# Patient Record
Sex: FEMALE | Race: ASIAN | NOT HISPANIC OR LATINO | Employment: FULL TIME | URBAN - METROPOLITAN AREA
[De-identification: names, ages, dates, MRNs, and addresses within clinical notes are randomized per-mention and may not be internally consistent; named-entity substitution may affect disease eponyms.]

---

## 2017-05-18 ENCOUNTER — COMMUNICATION - HEALTHEAST (OUTPATIENT)
Dept: SCHEDULING | Facility: CLINIC | Age: 27
End: 2017-05-18

## 2017-07-12 ENCOUNTER — COMMUNICATION - HEALTHEAST (OUTPATIENT)
Dept: FAMILY MEDICINE | Facility: CLINIC | Age: 27
End: 2017-07-12

## 2018-07-11 ENCOUNTER — COMMUNICATION - HEALTHEAST (OUTPATIENT)
Dept: FAMILY MEDICINE | Facility: CLINIC | Age: 28
End: 2018-07-11

## 2018-07-11 DIAGNOSIS — R94.39 ABNORMAL CARDIOVASCULAR STRESS TEST: ICD-10-CM

## 2018-07-12 ENCOUNTER — COMMUNICATION - HEALTHEAST (OUTPATIENT)
Dept: FAMILY MEDICINE | Facility: CLINIC | Age: 28
End: 2018-07-12

## 2018-07-13 ENCOUNTER — OFFICE VISIT - HEALTHEAST (OUTPATIENT)
Dept: FAMILY MEDICINE | Facility: CLINIC | Age: 28
End: 2018-07-13

## 2018-07-13 DIAGNOSIS — Z32.00 ENCOUNTER FOR PREGNANCY TEST: ICD-10-CM

## 2018-07-13 DIAGNOSIS — N91.2 AMENORRHEA: ICD-10-CM

## 2018-07-13 DIAGNOSIS — Z3A.01 PREGNANCY WITH 7 COMPLETED WEEKS GESTATION: ICD-10-CM

## 2018-07-13 DIAGNOSIS — R10.9 ABDOMINAL CRAMPING: ICD-10-CM

## 2018-07-13 LAB — HCG UR QL: POSITIVE

## 2018-07-13 ASSESSMENT — MIFFLIN-ST. JEOR: SCORE: 1250.67

## 2018-07-17 ENCOUNTER — COMMUNICATION - HEALTHEAST (OUTPATIENT)
Dept: FAMILY MEDICINE | Facility: CLINIC | Age: 28
End: 2018-07-17

## 2018-07-20 ENCOUNTER — HOSPITAL ENCOUNTER (OUTPATIENT)
Dept: ULTRASOUND IMAGING | Facility: HOSPITAL | Age: 28
Discharge: HOME | End: 2018-07-20
Attending: FAMILY MEDICINE

## 2018-07-21 ENCOUNTER — COMMUNICATION - HEALTHEAST (OUTPATIENT)
Dept: FAMILY MEDICINE | Facility: CLINIC | Age: 28
End: 2018-07-21

## 2018-07-25 ENCOUNTER — PRENATAL OFFICE VISIT - HEALTHEAST (OUTPATIENT)
Dept: FAMILY MEDICINE | Facility: CLINIC | Age: 28
End: 2018-07-25

## 2018-07-25 DIAGNOSIS — Z34.90 SUPERVISION OF NORMAL PREGNANCY: ICD-10-CM

## 2018-07-25 DIAGNOSIS — Z32.01 PREGNANCY TEST POSITIVE: ICD-10-CM

## 2018-07-25 LAB
ALBUMIN UR-MCNC: NEGATIVE MG/DL
AMPHETAMINES UR QL SCN: NORMAL
BARBITURATES UR QL: NORMAL
BASOPHILS # BLD AUTO: 0.1 THOU/UL (ref 0–0.2)
BASOPHILS NFR BLD AUTO: 1 % (ref 0–2)
BENZODIAZ UR QL: NORMAL
CANNABINOIDS UR QL SCN: NORMAL
COCAINE UR QL: NORMAL
CREAT UR-MCNC: 189.6 MG/DL
EOSINOPHIL # BLD AUTO: 0.2 THOU/UL (ref 0–0.4)
EOSINOPHIL NFR BLD AUTO: 2 % (ref 0–6)
ERYTHROCYTE [DISTWIDTH] IN BLOOD BY AUTOMATED COUNT: 13.1 % (ref 11–14.5)
GLUCOSE UR STRIP-MCNC: NEGATIVE MG/DL
HCT VFR BLD AUTO: 37.1 % (ref 35–47)
HGB BLD-MCNC: 12 G/DL (ref 12–16)
HIV 1+2 AB+HIV1 P24 AG SERPL QL IA: NEGATIVE
KETONES UR STRIP-MCNC: NEGATIVE MG/DL
LYMPHOCYTES # BLD AUTO: 2.3 THOU/UL (ref 0.8–4.4)
LYMPHOCYTES NFR BLD AUTO: 23 % (ref 20–40)
MCH RBC QN AUTO: 27.6 PG (ref 27–34)
MCHC RBC AUTO-ENTMCNC: 32.3 G/DL (ref 32–36)
MCV RBC AUTO: 85 FL (ref 80–100)
MONOCYTES # BLD AUTO: 0.7 THOU/UL (ref 0–0.9)
MONOCYTES NFR BLD AUTO: 7 % (ref 2–10)
NEUTROPHILS # BLD AUTO: 6.6 THOU/UL (ref 2–7.7)
NEUTROPHILS NFR BLD AUTO: 68 % (ref 50–70)
OPIATES UR QL SCN: NORMAL
OXYCODONE UR QL: NORMAL
PCP UR QL SCN: NORMAL
PLATELET # BLD AUTO: 329 THOU/UL (ref 140–440)
PMV BLD AUTO: 9.6 FL (ref 8.5–12.5)
RBC # BLD AUTO: 4.35 MILL/UL (ref 3.8–5.4)
WBC: 9.7 THOU/UL (ref 4–11)

## 2018-07-26 LAB
ABO/RH(D): NORMAL
ABORH REPEAT: NORMAL
ANTIBODY SCREEN: NEGATIVE
BACTERIA SPEC CULT: NO GROWTH
COLLECTION METHOD: NORMAL
GUARDIAN FIRST NAME: NORMAL
GUARDIAN LAST NAME: NORMAL
HBV SURFACE AG SERPL QL IA: NEGATIVE
HEALTH CARE PROVIDER CITY: NORMAL
HEALTH CARE PROVIDER NAME: NORMAL
HEALTH CARE PROVIDER PHONE: NORMAL
HEALTH CARE PROVIDER STATE: NORMAL
HEALTH CARE PROVIDER STREET ADDRESS: NORMAL
HEALTH CARE PROVIDER ZIP CODE: NORMAL
LEAD BLD-MCNC: NORMAL UG/DL
LEAD RETEST: NO
LEAD, B: <1 MCG/DL (ref 0–4.9)
PATIENT CITY: NORMAL
PATIENT COUNTY: NORMAL
PATIENT EMPLOYER: NORMAL
PATIENT ETHNICITY: NORMAL
PATIENT HOME PHONE: NORMAL
PATIENT OCCUPATION: NORMAL
PATIENT RACE: NORMAL
PATIENT STATE: NORMAL
PATIENT STREET ADDRESS: NORMAL
PATIENT ZIP CODE: NORMAL
RUBV IGG SERPL QL IA: POSITIVE
SUBMITTING LABORATORY PHONE: NORMAL
T PALLIDUM AB SER QL: NEGATIVE
VENOUS/CAPILLARY: NORMAL

## 2018-08-08 ENCOUNTER — COMMUNICATION - HEALTHEAST (OUTPATIENT)
Dept: FAMILY MEDICINE | Facility: CLINIC | Age: 28
End: 2018-08-08

## 2018-08-08 ENCOUNTER — AMBULATORY - HEALTHEAST (OUTPATIENT)
Dept: FAMILY MEDICINE | Facility: CLINIC | Age: 28
End: 2018-08-08

## 2018-08-08 ENCOUNTER — HOSPITAL ENCOUNTER (OUTPATIENT)
Dept: ULTRASOUND IMAGING | Facility: HOSPITAL | Age: 28
Discharge: HOME | End: 2018-08-08
Attending: PHYSICIAN ASSISTANT

## 2018-08-08 DIAGNOSIS — O20.0 THREATENED MISCARRIAGE: ICD-10-CM

## 2018-08-08 DIAGNOSIS — Z32.01 PREGNANCY TEST POSITIVE: ICD-10-CM

## 2018-08-08 DIAGNOSIS — Z34.90 SUPERVISION OF NORMAL PREGNANCY: ICD-10-CM

## 2018-08-22 ENCOUNTER — OFFICE VISIT - HEALTHEAST (OUTPATIENT)
Dept: FAMILY MEDICINE | Facility: CLINIC | Age: 28
End: 2018-08-22

## 2018-08-22 DIAGNOSIS — Z01.818 PRE-OP EXAM: ICD-10-CM

## 2018-08-22 DIAGNOSIS — O02.1 MISSED ABORTION: ICD-10-CM

## 2018-08-22 ASSESSMENT — MIFFLIN-ST. JEOR
SCORE: 1237.06

## 2018-08-23 ENCOUNTER — ANESTHESIA - HEALTHEAST (OUTPATIENT)
Dept: SURGERY | Facility: AMBULATORY SURGERY CENTER | Age: 28
End: 2018-08-23

## 2018-08-24 ENCOUNTER — SURGERY - HEALTHEAST (OUTPATIENT)
Dept: SURGERY | Facility: AMBULATORY SURGERY CENTER | Age: 28
End: 2018-08-24

## 2018-08-24 ENCOUNTER — HOSPITAL ENCOUNTER (OUTPATIENT)
Dept: SURGERY | Facility: AMBULATORY SURGERY CENTER | Age: 28
Discharge: HOME | End: 2018-08-24
Attending: OBSTETRICS & GYNECOLOGY | Admitting: OBSTETRICS & GYNECOLOGY
Payer: COMMERCIAL

## 2018-08-24 DIAGNOSIS — O02.1 MISSED ABORTION: ICD-10-CM

## 2018-08-24 LAB — HGB BLD-MCNC: 12.7 G/DL

## 2018-08-24 ASSESSMENT — MIFFLIN-ST. JEOR
SCORE: 1237.06
SCORE: 1237.06

## 2018-09-05 ENCOUNTER — AMBULATORY - HEALTHEAST (OUTPATIENT)
Dept: FAMILY MEDICINE | Facility: CLINIC | Age: 28
End: 2018-09-05

## 2018-12-20 ENCOUNTER — COMMUNICATION - HEALTHEAST (OUTPATIENT)
Dept: FAMILY MEDICINE | Facility: CLINIC | Age: 28
End: 2018-12-20

## 2019-01-09 ENCOUNTER — COMMUNICATION - HEALTHEAST (OUTPATIENT)
Dept: FAMILY MEDICINE | Facility: CLINIC | Age: 29
End: 2019-01-09

## 2019-06-13 ENCOUNTER — AMBULATORY - HEALTHEAST (OUTPATIENT)
Dept: LAB | Facility: HOSPITAL | Age: 29
End: 2019-06-13

## 2019-06-13 DIAGNOSIS — O99.810 ABNORMAL MATERNAL GLUCOSE TOLERANCE, ANTEPARTUM: ICD-10-CM

## 2019-06-13 LAB
FASTING STATUS PATIENT QL REPORTED: YES
GLUCOSE 1H P 100 G GLC PO SERPL-MCNC: 192 MG/DL (ref 70–179)
GLUCOSE 2H P 100 G GLC PO SERPL-MCNC: 148 MG/DL (ref 70–154)
GLUCOSE 3H P 100 G GLC PO SERPL-MCNC: 153 MG/DL (ref 70–139)
GLUCOSE P FAST SERPL-MCNC: 79 MG/DL (ref 70–94)

## 2019-07-21 ENCOUNTER — HOME CARE/HOSPICE - HEALTHEAST (OUTPATIENT)
Dept: HOME HEALTH SERVICES | Facility: HOME HEALTH | Age: 29
End: 2019-07-21

## 2019-08-05 ENCOUNTER — TELEPHONE (OUTPATIENT)
Dept: OTHER | Facility: CLINIC | Age: 29
End: 2019-08-05

## 2019-09-28 ENCOUNTER — COMMUNICATION - HEALTHEAST (OUTPATIENT)
Dept: SCHEDULING | Facility: CLINIC | Age: 29
End: 2019-09-28

## 2019-09-28 DIAGNOSIS — Z72.51 UNPROTECTED SEX: ICD-10-CM

## 2019-09-29 ENCOUNTER — COMMUNICATION - HEALTHEAST (OUTPATIENT)
Dept: SCHEDULING | Facility: CLINIC | Age: 29
End: 2019-09-29

## 2021-02-07 ENCOUNTER — COMMUNICATION - HEALTHEAST (OUTPATIENT)
Dept: SCHEDULING | Facility: CLINIC | Age: 31
End: 2021-02-07

## 2021-02-08 ENCOUNTER — COMMUNICATION - HEALTHEAST (OUTPATIENT)
Dept: NURSING | Facility: CLINIC | Age: 31
End: 2021-02-08

## 2021-02-08 ENCOUNTER — AMBULATORY - HEALTHEAST (OUTPATIENT)
Dept: NURSING | Facility: CLINIC | Age: 31
End: 2021-02-08

## 2021-02-08 DIAGNOSIS — U07.1 2019 NOVEL CORONAVIRUS DISEASE (COVID-19): ICD-10-CM

## 2021-02-11 ENCOUNTER — OFFICE VISIT - HEALTHEAST (OUTPATIENT)
Dept: FAMILY MEDICINE | Facility: CLINIC | Age: 31
End: 2021-02-11

## 2021-02-11 DIAGNOSIS — U07.1 PNEUMONIA DUE TO 2019 NOVEL CORONAVIRUS: ICD-10-CM

## 2021-02-11 DIAGNOSIS — O13.9 PIH (PREGNANCY INDUCED HYPERTENSION), ANTEPARTUM: ICD-10-CM

## 2021-02-11 DIAGNOSIS — O09.90 HIGH-RISK PREGNANCY, UNSPECIFIED TRIMESTER: ICD-10-CM

## 2021-02-11 DIAGNOSIS — J12.82 PNEUMONIA DUE TO 2019 NOVEL CORONAVIRUS: ICD-10-CM

## 2021-03-11 ENCOUNTER — AMBULATORY - HEALTHEAST (OUTPATIENT)
Dept: OBGYN | Facility: CLINIC | Age: 31
End: 2021-03-11

## 2021-03-11 DIAGNOSIS — I87.313 CHRONIC VENOUS HYPERTENSION (IDIOPATHIC) WITH ULCER OF BILATERAL LOWER EXTREMITY (H): ICD-10-CM

## 2021-03-11 DIAGNOSIS — L97.929 CHRONIC VENOUS HYPERTENSION (IDIOPATHIC) WITH ULCER OF BILATERAL LOWER EXTREMITY (H): ICD-10-CM

## 2021-03-11 DIAGNOSIS — L97.919 CHRONIC VENOUS HYPERTENSION (IDIOPATHIC) WITH ULCER OF BILATERAL LOWER EXTREMITY (H): ICD-10-CM

## 2021-03-17 ENCOUNTER — RECORDS - HEALTHEAST (OUTPATIENT)
Dept: ADMINISTRATIVE | Facility: OTHER | Age: 31
End: 2021-03-17

## 2021-03-17 ENCOUNTER — ANESTHESIA - HEALTHEAST (OUTPATIENT)
Dept: OBGYN | Facility: HOSPITAL | Age: 31
End: 2021-03-17

## 2021-03-17 ENCOUNTER — SURGERY - HEALTHEAST (OUTPATIENT)
Dept: OBGYN | Facility: HOSPITAL | Age: 31
End: 2021-03-17

## 2021-03-17 ASSESSMENT — MIFFLIN-ST. JEOR: SCORE: 1250.67

## 2021-06-01 VITALS — WEIGHT: 140 LBS | BODY MASS INDEX: 28.28 KG/M2

## 2021-06-01 VITALS — HEIGHT: 59 IN | WEIGHT: 140 LBS | BODY MASS INDEX: 28.22 KG/M2

## 2021-06-01 VITALS
HEIGHT: 59 IN | HEIGHT: 59 IN | WEIGHT: 137 LBS | WEIGHT: 137 LBS | BODY MASS INDEX: 27.62 KG/M2 | BODY MASS INDEX: 27.62 KG/M2

## 2021-06-01 VITALS — BODY MASS INDEX: 27.62 KG/M2 | HEIGHT: 59 IN | WEIGHT: 137 LBS

## 2021-06-01 NOTE — TELEPHONE ENCOUNTER
Call from pt      Status update Rx      She called pharmacy today - nothing there to            A/P:   > Plan B noted as OTC - I will call in as RX to see if your insurance picks up cost that way       I called into pharmacy - provided verbal for Quant #1 / 0 refills        Cortez Mazariegos RN   Triage and Medication Refills

## 2021-06-01 NOTE — TELEPHONE ENCOUNTER
Pt is calling in requesting emergency contraception due to unprotected sex she had last night. Pt denies sexual assault. Pt denies bleeding or vomiting. Pt is not pregnant. On call doctor (Orlando Rice) was called and gave order for (Levonorgestrel) Plan B. Pt reports she will pick it up in the morning. Encouraged pt to pick it up as soon as possible, as it should be taken within 72 hours. Advised pt that Plan B can also be purchased over the counter. Pt agrees with plan.     Lobo Knowles RN Care Connection Triage/Medication Refill    Reason for Disposition    Caller requests prescription for Emergency Contraceptive Pill(s)    Protocols used: CONTRACEPTION - EMERGENCY-A-

## 2021-06-05 VITALS — HEIGHT: 59 IN | BODY MASS INDEX: 28.22 KG/M2 | WEIGHT: 140 LBS

## 2021-06-15 NOTE — PROGRESS NOTES
Olga Singleton is a 31 y.o. female who is being evaluated via a billable video visit.      How would you like to obtain your AVS? MyChart.  If dropped from the video visit, the video invitation should be resent by: Send to e-mail at: [email or web address]  Will anyone else be joining your video visit? No      Video Start Time: 10:52    Assessment & Plan     Pneumonia due to 2019 novel coronavirus  PIH (pregnancy induced hypertension), antepartum  High-risk pregnancy, unspecified trimester  EHR reviewed.   Natural course discussed.   Supportive care recommended.   She will monitor her symptoms closely. She does have pulse oximetry at home. Can monitor her blood pressure as well. She has held her antihypertensives until now. She was advised to await BP check tomorrow at OB until restarting medications since her pressure was so low a few days ago.   She is aware of reasons to be seen urgently. Keep appointment with OB. Consider follow up here in two weeks to ensure resolution of symptoms.   She does not feel ready to return to work. A note was provided to excuse her absence for the next two weeks.        Return in about 2 weeks (around 2021) for Recheck.    Kayla Hess MD  [facility]   Olga Singleton is 31 y.o. and presents today for the following health issues:  31 year old  at 30 weeks, 3 days gestation seen for follow up of covid 19.   She is working with [facility] for her prenatal care. This pregnancy has been complicated by gestational hypertension. Was taking labetolol twice per day.   Was seen at the ED on 21 with worsening cough, trouble breathing and fever. She was found to be positive on 21. She was tested because her  and other family members were positive. She was doing ok but suddenly felt like things were worsening. In the ED she was found to have covid 19 associated pneumonia. She was given supportive care. Was discharged home the same day.  Her antihypertensives were held due to hypotension.               Objective    Vitals:  No vitals were obtained today due to virtual visit.    Physical Exam  GENERAL: Healthy, alert and no distress  EYES: Eyes grossly normal to inspection. No discharge or erythema, or obvious scleral/conjunctival abnormalities.  RESP: No audible wheeze or visible cyanosis.  No visible retractions or increased work of breathing. Is coughing intermittently.   NEURO: Cranial nerves grossly intact. Mentation and speech appropriate for age.  PSYCH: Mentation appears normal, affect normal/bright, judgement and insight intact, normal speech and appearance well-groomed        Video-Visit Detail    Type of service:  Video Visit    Video End Time (time video stopped): 11:12 AM  Originating Location (pt. Location): Home    Distant Location (provider location):  [facility]     Platform used for Video Visit: Jonnie Hess MD

## 2021-06-15 NOTE — PROGRESS NOTES
Clinic Care Coordination Contact:  Community Health Worker Initial Outreach    Reason: [id number] - Ambulatory referral to Care Management (Primary Care)  Note    Patient was discharged  with COVID.   CCC to reach out for resources and to make sure has clear understanding of discharge instructions and COVID precautions.   Assist to make follow up appointment with PCP if needed.        CHW Initial Information Gathering:  Referral Source: PCP  Preferred Hospital: [facility]  [phone]  Preferred Urgent Care: [facility], [phone]  Current living arrangement:: Not Assessed  CHW Additional Questions  If ED/Hospital discharge, follow-up appointment scheduled as recommended?: Yes(ED discharge follow up appt scheduled 2- at 10:20AM with Dr. Hess via virtual visit per pt agreed. NOTE: patient prefer virtual visit.)    Discussion: The clinic Community Health Worker talked with the patient today at the request of the PCP to discuss possible Clinic Care Coordination enrollment.  The service was described to the patient and immediate needs were discussed.  The patient declined enrollement at this time. Completed questioning the patient regarding to notes (above) attached with the referral. CHW offered to assist pt with ED discharge appt scheduling with PCP- Pt agreed. CHW refer pt to connect with PCP office at [phone] with any questions and to be re-refer to CCC service in the future. Pt confirmed understand. The PCP is encouraged to refer in the future if the patient's needs change.    Patient ED discharge follow up appt scheduled 2- at 10:20AM with Dr. Hess via virtual visit (NOTE: pt prefer virtual visit-noted in the appt notes section).     Patient reported/confirmed:  -Confirmed: ED discharged yesterday, 2/7/2021 with COVID. Understood discharge instructions and COVID precautions. No questions regarding to med other discharge instructions.    -No resources need  or any other concerns at this time. Thank you.     Patient accepts CC: No, has no resources need or any other concerns at this time per patient. Patient confirmed she will connect with pcp office for any questions and to be re-refer to CCC service in the future.     CHW Plan:   No further action need from CCC/CHW at this time.   Message route to updates pcp.

## 2021-06-15 NOTE — TELEPHONE ENCOUNTER
Olga is GA 29w6d.    Tested positive for COVID on 2/2.     Now on Day 6 fever chills and body aches.  shortness of breath started 3 days ago and is worsening. Has bloody sputum.    Moderate shortness of breath, patient can be heard with labored breathing over the phone.    OB - Dr. Constantino Gillis OB Gyn.    PLAN:  - ED per protocol.  - care advice reviewed  - call back for further concerns  - patient verbalized understanding and will head to [facility] ED    [facility] ED notified and provider aware of her arrival. They have a fetal monitor on site ready for patient.    Tess Cole RN/[place] Nurse Advisor            Reason for Disposition    MODERATE difficulty breathing (e.g., speaks in phrases, SOB even at rest, pulse 100-120)    Additional Information    Negative: SEVERE difficulty breathing (e.g., struggling for each breath, speaks in single words)    Negative: Difficult to awaken or acting confused (e.g., disoriented, slurred speech)    Negative: Bluish (or gray) lips or face now    Negative: Shock suspected (e.g., cold/pale/clammy skin, too weak to stand, low BP, rapid pulse)    Negative: Sounds like a life-threatening emergency to the triager    Negative: SEVERE or constant chest pain or pressure (Exception: mild central chest pain, present only when coughing)    Protocols used: CORONAVIRUS (COVID-19) DIAGNOSED OR [id number] 1.3.21

## 2021-06-16 PROBLEM — O13.9 PIH (PREGNANCY INDUCED HYPERTENSION), ANTEPARTUM: Status: ACTIVE | Noted: 2019-07-18

## 2021-06-16 PROBLEM — Z98.891 S/P C-SECTION: Status: ACTIVE | Noted: 2021-03-18

## 2021-06-16 PROBLEM — Z34.90 PREGNANT: Status: ACTIVE | Noted: 2019-07-18

## 2021-06-16 NOTE — ANESTHESIA PREPROCEDURE EVALUATION
Anesthesia Evaluation      Patient summary reviewed   No history of anesthetic complications     Airway   Mallampati: II   Pulmonary - negative ROS and normal exam                          Cardiovascular - negative ROS  Exercise tolerance: > or = 4 METS  (+) hypertension (resolved), ,     Rhythm: regular  Rate: normal,         Neuro/Psych - negative ROS     Endo/Other - negative ROS   (+) diabetes mellitus, pregnant (Active abruption requiring emergent )     GI/Hepatic/Renal - negative ROS      Other findings: Lab Results      Component                Value               Date                       WBC                      7.7                 2021                 HGB                      10.9 (L)            2021                 HCT                      34.9 (L)            2021                 MCV                      78 (L)              2021                 PLT                      252                 2021                  Dental - normal exam                          Anesthesia Plan  Planned anesthetic: general endotracheal  TRUE RSI  Type and Screen  ASA 4 - emergent   Induction: intravenous   Anesthetic plan and risks discussed with: patient  Anesthesia plan special considerations: rapid sequence induction, increased risk of difficult airway, antiemetics,   Post-op plan: routine recovery

## 2021-06-16 NOTE — ANESTHESIA CARE TRANSFER NOTE
Last vitals:   Vitals:    03/17/21 1704   BP: 128/58   Pulse: 75   Resp: 16   Temp: 36.7  C (98.1  F)   SpO2: 100%     Patient's level of consciousness is awake  Spontaneous respirations: yes  Maintains airway independently: yes  Dentition unchanged: yes  Oropharynx: oropharynx clear of all foreign objects    QCDR Measures:  ASA# 20 - Surgical Safety Checklist: WHO surgical safety checklist completed prior to induction    PQRS# 430 - Adult PONV Prevention: 4558F - Pt received => 2 anti-emetic agents (different classes) preop & intraop  ASA# 8 - Peds PONV Prevention: NA - Not pediatric patient, not GA or 2 or more risk factors NOT present  PQRS# 424 - Miri-op Temp Management: 4559F - At least one body temp DOCUMENTED => 35.5C or 95.9F within required timeframe  PQRS# 426 - PACU Transfer Protocol: - Transfer of care checklist used  ASA# 14 - Acute Post-op Pain: ASA14B - Patient did NOT experience pain >= 7 out of 10     Volatile agents turned off, no paralytic to reverse, 4/4 twitches with sustained tetany. Pt breathing spontaneously with adequate tidal volumes, following commands, gently suctioned oropharynx, extubated without issue. 10LPM O2 applied via face mask.Transported by CRNA and RN to recovery.

## 2021-06-16 NOTE — ANESTHESIA POSTPROCEDURE EVALUATION
Patient: Olga Singleton  Procedure(s):   SECTION  Anesthesia type: general    Patient location: PACU  Last vitals:   Vitals Value Taken Time   /81 21 1746   Temp 36.7  C (98.1  F) 21 1704   Pulse 75 21 1752   Resp 13 21 1752   SpO2 100 % 21 1752   Vitals shown include unvalidated device data.  Post vital signs: stable  Level of consciousness: awake and responds to simple questions  Post-anesthesia pain: pain controlled  Post-anesthesia nausea and vomiting: no  Pulmonary: unassisted, return to baseline  Cardiovascular: stable and blood pressure at baseline  Hydration: adequate  Anesthetic events: no    QCDR Measures:  ASA# 11 - Miri-op Cardiac Arrest: ASA11B - Patient did NOT experience unanticipated cardiac arrest  ASA# 12 - Miri-op Mortality Rate: ASA12B - Patient did NOT die  ASA# 13 - PACU Re-Intubation Rate: ASA13B - Patient did NOT require a new airway mgmt  ASA# 10 - Composite Anes Safety: ASA10A - No serious adverse event    Additional Notes:

## 2021-06-19 NOTE — PROGRESS NOTES
Patient had an initial office visit for pregnancy on 2018, had an ultrasound done on 2018 that showed a 5-week 4-day pregnancy, no embryo visualized.  I saw her for pregnancy intake on 2018.  Please see that note for details.  She was feeling fine, no concerns.  .  Blood type O+.  We had plans to get a follow-up ultrasound in approximately 2 weeks assess for interval growth and viability.  She had a follow-up ultrasound done this morning.    Single abnormally shaped intrauterine anechoic sac. Mean sac diameter is 29 mm. Possible 3 mm embryonic disc without heartbeat.     Patient should have been 8 weeks 2 days today, based on initial gestational age from first ultrasound.  I called patient on her cell phone and reviewed results.    She is feeling fine, no cramping or bleeding.  I reviewed that ultrasound shows pregnancy is not healthy and has not been growing appropriately.    I discussed general course of miscarriage with patient.  She can monitor and watch for miscarriage over the next 2 weeks, or we could have her see OB/GYN sooner for follow-up.  She is not sure what she would like to do, would like to think about it for day or two.  She will contact us if she would like to see OB/GYN.  I discussed warning signs for problems during miscarriage such as significant pain or too much bleeding.  If patient has either one of these, or other concerns, she is instructed to call us or go in to the emergency room.  Condolences expressed.  Burial/cremation services offered.  Pregnancy loss counseling services offered.  Patient will let us know if she is interested in these.  She was accompanied by her  today and she spoke with him during our phone call.    All of her questions were answered.  She is encouraged to follow-up in clinic or through MyChart/phone if she has further questions or concerns.

## 2021-06-19 NOTE — PROGRESS NOTES
" Patient ID: Olga Singleton is a 28 y.o. female.  /74  Pulse 87  Ht 4' 11\" (1.499 m)  Wt 140 lb (63.5 kg)  LMP 2018 Comment: irregular  SpO2 100%  BMI 28.28 kg/m2    Assessment/Plan:                   Diagnoses and all orders for this visit:    Encounter for pregnancy test  -     Pregnancy, Urine    Amenorrhea    Pregnancy with 7 completed weeks gestation  -     US OB < 14 Weeks    Abdominal cramping          DISCUSSION  Obtain ultrasound to help with dating.  Abdominal cramping likely to be of a benign etiology but discussed signs and symptoms which would require further evaluation.  She has an overall benign exam.    Continue with prenatal vitamin.  Schedule first obstetrical visit with primary care provider.  Subjective:     HPI    Olga Singleton is a 28 y.o. female who is here today reporting a history of positive home pregnancy test and likely missed menstrel period.  Patient and her  have trying to conceive and she is happy about this.  She states she has taken 3 tests at home 2 of them have been positive but one was negative so she is somewhat concerned.  Patient also reports some fleeting crampy like abdominal pain that occurred the other day on the right side but has not returned.  She reports no unusual bleeding leakage of fluid or other concerns.  She is a healthy individual.  She does not have ongoing medical problems and does not take medications although started a prenatal vitamin with folate upon her first positive urine pregnancy test.  Patient states that she has had one other pregnancy and delivery making her a .  She states that she developed a pre-eclampsia and required induction with her last pregnancy and 3.  She reports no significant issues after delivery.    Based on a last menstrual period start date of May 19, 2018 patient would be 7 weeks 6 days gestation at this time.  Her estimated date of delivery would be 2018.  Patient reports that she does have " "irregular periods ranging between 28-56 days on average.    Review of Systems  Complete review of systems is obtained.  Other than the specific considerations noted above complete review of systems is negative.          Objective:   Medications:  Current Outpatient Prescriptions   Medication Sig Note     PRENATAL 19 29 mg iron- 1 mg Chew  3/31/2016: Received from: External Pharmacy     ascorbic acid (VITAMIN C) 500 MG tablet Take 1 tablet (500 mg total) by mouth 2 (two) times a day with meals.      labetalol (TRANDATE; NORMODYNE) 100 MG tablet Take 1 tablet (100 mg total) by mouth 2 (two) times a day.      prenatal #115-iron-folic acid 29 mg iron- 1 mg Chew Chew 1 tablet daily.        Allergies:  No Known Allergies    Tobacco:   reports that she has never smoked. She has never used smokeless tobacco.     Physical Exam          /74  Pulse 87  Ht 4' 11\" (1.499 m)  Wt 140 lb (63.5 kg)  LMP 05/24/2018 Comment: irregular  SpO2 100%  BMI 28.28 kg/m2            General Appearance:    Alert, cooperative, no distress, appears stated age   Eyes:   No scleral icterus or conjunctival irritation       Lungs:     Clear to auscultation bilaterally, respirations unlabored, no wheezes or crackles   Heart:    Regular rate and rhythm,  no murmur, rub   or gallop   Abdomen:     Soft, non-tender, bowel sounds active,     no masses, no organomegaly     Extremities:   Extremities normal, atraumatic, no cyanosis or edema   Skin:   Skin color, texture, turgor normal, no rashes or lesions   Neurologic:   Normal strength and sensation        throughout on gross examination.                     "

## 2021-06-19 NOTE — PROGRESS NOTES
Chief Complaint:  Chief Complaint   Patient presents with     Pregnancy Confirmation     #2     HPI:   Olga Singleton is a 28 y.o. female is here for pregnancy intake.  She is .  Patient's last menstrual period was 2018.  Feeling tired, otherwise OK.    Past medical history: reviewed and updated.  Past Medical History:   Diagnosis Date     Arthropathy Of The Ankle / Foot     Created by Conversion      Blood loss anemia 4/3/2016     History of transfusion      Hypertension     with this  pregnancy     Hypertension      Normal delivery      Preeclampsia 2016     Proteinuria 3/31/2016     Urinary tract infection      Past Surgical History:   Procedure Laterality Date     none         Current Outpatient Prescriptions:      prenatal #115-iron-folic acid 29 mg iron- 1 mg Chew, Chew 1 tablet daily., Disp: , Rfl:     Social:  Social History   Substance Use Topics     Smoking status: Never Smoker     Smokeless tobacco: Never Used     Alcohol use No       OBJECTIVE:  No Known Allergies  Vitals:    18 1334   BP: 128/64   Pulse: 64   Resp: 16     Body mass index is 28.28 kg/(m^2).    Vital signs stable as recorded above  General: Patient is alert and oriented x 3, in no apparent distress  Physical exam deferred to patient's First OB Visit.    Results:  Results for orders placed or performed in visit on 18   Culture, Urine   Result Value Ref Range    Culture No Growth    Urinalysis, OB Screen (ketones, glucose, protein only)   Result Value Ref Range    Glucose, UA Negative Negative    Ketones, UA Negative Negative    Protein, UA Negative Negative mg/dL   ABO/RH Typing (OP order)   Result Value Ref Range    HML ABO/Rh Typing O POS     HML ABO/Rh Repeat Typing O POS    Hepatitis B Surface antigen (HBsAG)   Result Value Ref Range    Hepatitis B Surface Ag Negative Negative   HIV Antigen/Antibody Screening Cascade   Result Value Ref Range    HIV Antigen / Antibody Negative Negative   HML Antibody Screen    Result Value Ref Range    HML Antibody Screen Negative Negative   RPR   Result Value Ref Range    Treponema Antibody (Syphilis) Negative Negative   Rubella Immune Status (IgG)   Result Value Ref Range    Rubella Antibody, IgG Positive    Lead, Blood   Result Value Ref Range    Lead  <5.0 ug/dL    Collection Method Venous     Lead Retest No    Drugs of Abuse 1,Urine   Result Value Ref Range    Amphetamines Screen Negative Screen Negative    Benzodiazepines Screen Negative Screen Negative    Opiates Screen Negative Screen Negative    Phencyclidine Screen Negative Screen Negative    THC Screen Negative Screen Negative    Barbiturates Screen Negative Screen Negative    Cocaine Metabolite Screen Negative Screen Negative    Oxycodone Screen Negative Screen Negative    Creatinine, Urine 189.6 mg/dL   HM1 (CBC with Diff)   Result Value Ref Range    WBC 9.7 4.0 - 11.0 thou/uL    RBC 4.35 3.80 - 5.40 mill/uL    Hemoglobin 12.0 12.0 - 16.0 g/dL    Hematocrit 37.1 35.0 - 47.0 %    MCV 85 80 - 100 fL    MCH 27.6 27.0 - 34.0 pg    MCHC 32.3 32.0 - 36.0 g/dL    RDW 13.1 11.0 - 14.5 %    Platelets 329 140 - 440 thou/uL    MPV 9.6 8.5 - 12.5 fL    Neutrophils % 68 50 - 70 %    Lymphocytes % 23 20 - 40 %    Monocytes % 7 2 - 10 %    Eosinophils % 2 0 - 6 %    Basophils % 1 0 - 2 %    Neutrophils Absolute 6.6 2.0 - 7.7 thou/uL    Lymphocytes Absolute 2.3 0.8 - 4.4 thou/uL    Monocytes Absolute 0.7 0.0 - 0.9 thou/uL    Eosinophils Absolute 0.2 0.0 - 0.4 thou/uL    Basophils Absolute 0.1 0.0 - 0.2 thou/uL   Lead Venous With Demographics   Result Value Ref Range    Lead, B <1.0 0.0 - 4.9 mcg/dL    Venous/Capillary Venous     Patient Street Address [address] [place] [address]     Patient Zip Code [address]     [facility]     Patient Home Phone [phone]     Patient Race      Patient Ethnicity NON-     Patient Occupation NA     Patient Employer NA     Guardian First Name NA      Guardian Last Name      Health Care Provider Name EMILY MARTINEZ PA-C     Health Care Provider Street Address      Health Care Provider [facility]     Health Care Provider [facility]     Health Care Provider Zip Code      Health Care Provider Phone [phone]     Submitting Laboratory Phone [phone]      Other screening pregnancy lab work is ordered and pending.    Patient scored a 0/30 on [place]  Depression Screen.    Assessment and Plan:  1. Pregnancy Intake Appointment.  Olga Singleton is 28 y.o. and .  Patient's last menstrual period was 2018.  Estimated Date of Delivery: 3/18/19  She will be seeing Dr. Hess for OB care.  Screening pregnancy lab work was drawn.  Prenatal vitamins prescribed.  Problem list and current medications reviewed and updated.  Dating ultrasound ordered.  Prenatal info packet given.  First trimester screening was discussed with patient.  She would like this done, ordered today.    2. Preeclampsia .  Took blood pressure medicine for a few months post-partum, then stopped.    Follow up in 6 weeks.  Please see OB Episode for complete details.  Visit was approximately 30 minutes, greater than 50% of time spent in face-to-face counseling and coordination of care.    This dictation uses voice recognition software, which may contain typographical errors.

## 2021-06-20 ENCOUNTER — HEALTH MAINTENANCE LETTER (OUTPATIENT)
Age: 31
End: 2021-06-20

## 2021-06-20 NOTE — ANESTHESIA POSTPROCEDURE EVALUATION
Patient: Olga Singleton  SUCTION DILATION AND CURETTAGE  Anesthesia type: MAC    Patient location: Phase II Recovery  Last vitals:   Vitals:    08/24/18 1212   BP:    Pulse: 92   Resp: 16   Temp:    SpO2: 97%     Post vital signs: stable  Level of consciousness: awake and responds to simple questions  Post-anesthesia pain: pain controlled  Post-anesthesia nausea and vomiting: no  Pulmonary: unassisted, return to baseline  Cardiovascular: stable and blood pressure at baseline  Hydration: adequate  Anesthetic events: no    QCDR Measures:  ASA# 11 - Miri-op Cardiac Arrest: ASA11B - Patient did NOT experience unanticipated cardiac arrest  ASA# 12 - Miri-op Mortality Rate: ASA12B - Patient did NOT die  ASA# 13 - PACU Re-Intubation Rate: ASA13B - Patient did NOT require a new airway mgmt  ASA# 10 - Composite Anes Safety: ASA10A - No serious adverse event    Additional Notes:

## 2021-06-20 NOTE — OP NOTE
PROCEDURE NOTE - SUCTION D & C    NAME: Olga Singleton  : 1990  MRN: [id number]     DATE OF SERVICE: 2018     PREOPERATIVE DIAGNOSIS: Missed AB at 8 weeks gestation    POSTOPERATIVE DIAGNOSIS: Same    PROCEDURE: Suction dilatation and curettage    SURGEON: Jv Del Cid    ANESTHESIA: MAC    ESTIMATED BLOOD LOSS: 20 mL from 2018 11:05 AM to 2018 11:31 AM    SPECIMENS: Uterine contents, sent to pathology    COMPLICATIONS: None.     HISTORY OF PRESENT ILLNESS:  This is a 28 y.o. female with a known missed . The risks, benefits and alternatives to the procedure were discussed with her at length.  She expressed understanding and wished to proceed.     PROCEDURE NOTE:  Patient was brought to the operating room and after induction of anesthesia was prepped and draped in the dorsal lithotomy position.  A time out was called and the patient and the procedure were verified.  A bimanual exam was done.  Uterus was noted to be 9 weeks gestation. The bladder was drained of urine.  A sterile speculum was placed.  The anterior lip of the cervix was grasped with a single tooth tenaculum.  Yolanda cervical dilators were used to dilate the cervix.  A # 8 suction curette was induced and rotated to clear the uterus of all products of conception.  A sharp curette was then introduced. Once it was felt that all tissue was removed from all quadrants a decision was made to terminate the procedure. Sponge and instrument counts were correct. Patient tolerated the procedure well and was taken to the recovery room in good condition.      Jv Del Cid      CC: Kayla Hess MD, Jv Del Cid

## 2021-06-20 NOTE — ANESTHESIA CARE TRANSFER NOTE
Last vitals:   Vitals:    08/24/18 0922   BP: 121/77   Pulse: 77   Resp: 16   Temp: 37.1  C (98.8  F)   SpO2: 99%     Patient's level of consciousness is drowsy  Spontaneous respirations: yes  Maintains airway independently: yes  Dentition unchanged: yes  Oropharynx: oropharynx clear of all foreign objects    QCDR Measures:  ASA# 20 - Surgical Safety Checklist: WHO surgical safety checklist completed prior to induction  PQRS# 430 - Adult PONV Prevention: 4558F - Pt received => 2 anti-emetic agents (different classes) preop & intraop  ASA# 8 - Peds PONV Prevention: NA - Not pediatric patient, not GA or 2 or more risk factors NOT present  PQRS# 424 - Miri-op Temp Management: NA - MAC anesthesia or case < 60 minutes  PQRS# 426 - PACU Transfer Protocol: - Transfer of care checklist used  ASA# 14 - Acute Post-op Pain: ASA14B - Patient did NOT experience pain >= 7 out of 10

## 2021-06-20 NOTE — H&P
HISTORY AND PHYSICAL UPDATE     I have examined the patient and reviewed the history and physical that is present on this chart. The changes in the patient's history and physical condition are as follows: None    Jv Del Cid MD    [facility]  Office: [phone]

## 2021-06-20 NOTE — ANESTHESIA PREPROCEDURE EVALUATION
Anesthesia Evaluation      Patient summary reviewed   No history of anesthetic complications     Airway   Mallampati: II   Pulmonary - negative ROS and normal exam                          Cardiovascular - negative ROS  Exercise tolerance: > or = 4 METS  Hypertension: resolved.  Rhythm: regular  Rate: normal,         Neuro/Psych - negative ROS     Endo/Other - negative ROS      GI/Hepatic/Renal - negative ROS      Other findings: Results for MARIANA BAJWA YANA (MRN [id number]) as of 8/24/2018 09:51    8/24/2018 09:38  Hgb: 12.7        Dental - normal exam                        Anesthesia Plan  Planned anesthetic: MAC    ASA 1     Anesthetic plan and risks discussed with: patient    Post-op plan: routine recovery

## 2021-06-20 NOTE — PROGRESS NOTES
Preoperative Exam    Scheduled Procedure: d &C  Surgery Date:  2018  Surgery Location: [facility], fax [phone]    Surgeon:  Maria Eugenia    Assessment/Plan:     1. Pre-op exam     2. Missed           Known Risk Factors:   Healthy 28 year old female    1. Preoperative workup as follows:  No orders of the defined types were placed in this encounter.          2. Change in medication regimen before surgery:  NPO after midnight    3.    Patient Instructions     Hold all supplements, aspirin and NSAIDs for 7 days prior to surgery.  Follow your surgeon's direction on when to stop eating and drinking prior to surgery.  Your surgeon will be managing your pain after your surgery.    Remove all jewelry and metal piercings before your surgery.   Remove nail polish from fingers before surgery.          Surgical Procedure Risk: Low (reported cardiac risk generally < 1%)    Cardiac Risk Assessment: no increased risk for major cardiac complications    Patient has not had anesthesia before with no history of anesthesia reaction.  negative family history of anesthetic problems.      Patient approved for surgery with general or local anesthesia.        Functional Status: Independent  Patient plans to recover at home with family.         Subjective:      Olga Singleton is a 28 y.o. female  who presents for a preoperative consultation.  Patient has had a missed  and planned D&C.  Last u/s 18 showed no heartbeat with abnormal gestational sac.      Pertinent History  Do you have difficulty breathing after walking up a flight of stairs:No  Do you have chest pain after walking up a flight of stairs:  No  History of obstructive sleep apnea:  No  Steroid use in the last 6 months: No  Frequent Aspirin/NSAID use: No  Prior Blood Transfusion: Yes:  post partum hemorrhage  Prior Blood Transfusion Reaction: No  If for some reason prior to, during or after the procedure, if it is medically indicated, would you  be willing to have a blood transfusion?:  There is no transfusion refusal.      Have you had prior anesthesia?:No  Have you  had a previous anesthesia reaction:  N/A  Have any family members had a previous anesthesia reaction: No  Do you have a history of a clotting or bleeding disorder?:  No  Do any family members have a history of a clotting or bleeding disorder?:  No     ROS:    GENERAL: Fever: no Chills  no   Fatigue no  HEENT: Cold symptoms:no  RESPIRATORY:  Cough: no       Dyspnea: no  CARDIOVASCULAR: Chest Pain: no  Palpitations: no  GI: Vomiting: no   Diarrhea: no   : Dysuria: no  Frequency no  NEURO: Dysphasia: no   Motor Weakness: no   Numbness: no  SKIN: Rash: no  HEME:  Bleeding/Bruising Issues: no  MS:  Lower Extremity Swelling no    Exercise Capacity: Normal       MEDICATIONS:  Current Outpatient Prescriptions on File Prior to Visit   Medication Sig Dispense Refill     [DISCONTINUED] prenatal #115-iron-folic acid 29 mg iron- 1 mg Chew Chew 1 tablet daily.       No current facility-administered medications on file prior to visit.        ALLERGIES:  No Known Allergies    PROBLEM LIST:  Patient Active Problem List   Diagnosis     Eczema       PAST MEDICAL HISTORY:  Past Medical History:   Diagnosis Date     Arthropathy Of The Ankle / Foot     Created by Conversion      Blood loss anemia 4/3/2016    Now doing well     Chlamydia 2008     History of transfusion 2016    delivery     Hypertension     with this  pregnancy     Hypertension      Normal delivery      Preeclampsia 2016     Proteinuria 3/31/2016     Urinary tract infection        PAST SURGICAL HISTORY:  Past Surgical History:   Procedure Laterality Date     none         SOCIAL HISTORY:  Social History     Occupational History     Not on file.     Social History Main Topics     Smoking status: Never Smoker     Smokeless tobacco: Never Used     Alcohol use No     Drug use: No     Sexual activity: Yes     Partners: Male     Birth control/ protection:  ", Inserts       FAMILY HISTORY:  Family History   Problem Relation Age of Onset     Diabetes Mother      Hypertension Mother      Diabetes Father      Hypertension Father      Diabetes Paternal Grandfather      No Medical Problems Sister      No Medical Problems Brother      No Medical Problems Brother      No Medical Problems Brother      No Medical Problems Brother      No Medical Problems Sister      No Medical Problems Sister      No Medical Problems Sister        IMMUNIZATIONS:  Immunization History   Administered Date(s) Administered     Hep B, Peds, Historic 02/28/1992, 07/24/1992, 11/30/1992     Influenza,seasonal quad, PF, 36+MOS 11/17/2015     Meningococcal MPSV4, SQ 02/27/2007     Rubella: Immune 07/26/2018     Tdap 02/25/2016     Varicella: Immune 08/18/2015             Objective:     Vitals:    08/22/18 1306   BP: 100/70   Pulse: 80   Resp: 16   Temp: 98.1  F (36.7  C)   TempSrc: Oral   Weight: 137 lb (62.1 kg)   Height: 4' 11\" (1.499 m)       GEN:  NAD, cooperative  MS:  Alert, oriented, affect normal, speech normal  HEENT:  Conjunctiva clear.  Sclera anicteric.  Nares clear.  Oropharynx clear without erythema or exudate.  TMs and EACs normal.  NECK:  supple, no lymphadenopathy or thyromegaly  CV:  S1S2 RRR, no M/R/G  RESP:  CTA bilaterally  ABD: +BS, soft, nontender, nondistended, No organomegaly   EXT:  Warm and dry, no edema   NEUROLOGIC:  PERRLA. .  No tremor, no focal findings.  Normal gait.    SKIN:  No rashes or lesions.        Lab Review:   No labs were ordered during this visit                Moraima Mack MD  8/22/2018     Electronically signed by Moraima Mack MD   "

## 2021-06-21 NOTE — LETTER
Letter by Kayla Hess MD at      Author: Kayla Hess MD Service: -- Author Type: --    Filed:  Encounter Date: 2/11/2021 Status: (Other)         February 11, 2021     Patient: Olga Singleton   YOB: 1990   Date of Visit: 2/11/2021       To Whom It May Concern:    It is my medical opinion that Olga Singleton should remain out of work until 3/1/21.     If you have any questions or concerns, please don't hesitate to call.    Sincerely,        Electronically signed by Kayla Hess MD

## 2021-06-22 NOTE — TELEPHONE ENCOUNTER
The message was sent in an email that is could not be linked to this message, sent by previous RN.   Patient asking for letter to airline to be written regarding her pregnancy. Give my message below.

## 2021-06-22 NOTE — TELEPHONE ENCOUNTER
The patient emailed several days ago saying she has chosen [facility] as her OB provider, not this clinic. This matter would have to be taken up with her new OB provider and I think a visit would probably be necessary, but she can try to call them.

## 2021-06-22 NOTE — TELEPHONE ENCOUNTER
Left message x 1. Please review the notes below and advise the patient when they call back. Please schedule if necessary.

## 2021-06-23 NOTE — TELEPHONE ENCOUNTER
Patient Returning Call  Reason for call:  Letter/clarification  Information relayed to patient:  Patient is in contact with [facility] to get this letter as well.  She just contacted both clinics to see who could help.  She will continue to contact [facility] regarding this.  Patient has additional questions:  No  If YES, what are your questions/concerns:  N/A  Okay to leave a detailed message?: No call back needed

## 2021-07-12 ENCOUNTER — OFFICE VISIT (OUTPATIENT)
Dept: FAMILY MEDICINE | Facility: CLINIC | Age: 31
End: 2021-07-12
Payer: COMMERCIAL

## 2021-07-12 VITALS
WEIGHT: 131 LBS | SYSTOLIC BLOOD PRESSURE: 144 MMHG | TEMPERATURE: 98.3 F | HEART RATE: 98 BPM | DIASTOLIC BLOOD PRESSURE: 92 MMHG | HEIGHT: 58 IN | BODY MASS INDEX: 27.5 KG/M2

## 2021-07-12 DIAGNOSIS — Z00.00 ROUTINE HISTORY AND PHYSICAL EXAMINATION OF ADULT: Primary | ICD-10-CM

## 2021-07-12 DIAGNOSIS — I10 BENIGN ESSENTIAL HYPERTENSION: ICD-10-CM

## 2021-07-12 DIAGNOSIS — O13.9 PIH (PREGNANCY INDUCED HYPERTENSION), ANTEPARTUM: ICD-10-CM

## 2021-07-12 DIAGNOSIS — Z71.85 IMMUNIZATION COUNSELING: ICD-10-CM

## 2021-07-12 LAB
ALBUMIN SERPL-MCNC: 4.3 G/DL (ref 3.5–5)
ALBUMIN UR-MCNC: NEGATIVE MG/DL
ALP SERPL-CCNC: 86 U/L (ref 45–120)
ALT SERPL W P-5'-P-CCNC: 35 U/L (ref 0–45)
ANION GAP SERPL CALCULATED.3IONS-SCNC: 15 MMOL/L (ref 5–18)
APPEARANCE UR: CLEAR
AST SERPL W P-5'-P-CCNC: 24 U/L (ref 0–40)
BACTERIA #/AREA URNS HPF: NORMAL /HPF
BILIRUB SERPL-MCNC: 0.7 MG/DL (ref 0–1)
BILIRUB UR QL STRIP: NEGATIVE
BUN SERPL-MCNC: 12 MG/DL (ref 8–22)
CALCIUM SERPL-MCNC: 9.6 MG/DL (ref 8.5–10.5)
CHLORIDE BLD-SCNC: 104 MMOL/L (ref 98–107)
CHOLEST SERPL-MCNC: 225 MG/DL
CO2 SERPL-SCNC: 20 MMOL/L (ref 22–31)
COLOR UR AUTO: YELLOW
CREAT SERPL-MCNC: 0.69 MG/DL (ref 0.6–1.1)
ERYTHROCYTE [DISTWIDTH] IN BLOOD BY AUTOMATED COUNT: 14.6 % (ref 10–15)
FASTING STATUS PATIENT QL REPORTED: NO
GFR SERPL CREATININE-BSD FRML MDRD: >90 ML/MIN/1.73M2
GLUCOSE BLD-MCNC: 81 MG/DL (ref 70–125)
GLUCOSE UR STRIP-MCNC: NEGATIVE MG/DL
HCT VFR BLD AUTO: 37.3 % (ref 35–47)
HDLC SERPL-MCNC: 60 MG/DL
HGB BLD-MCNC: 11.6 G/DL (ref 11.7–15.7)
HGB UR QL STRIP: NEGATIVE
KETONES UR STRIP-MCNC: NEGATIVE MG/DL
LDLC SERPL CALC-MCNC: 141 MG/DL
LEUKOCYTE ESTERASE UR QL STRIP: NEGATIVE
MCH RBC QN AUTO: 24.7 PG (ref 26.5–33)
MCHC RBC AUTO-ENTMCNC: 31.1 G/DL (ref 31.5–36.5)
MCV RBC AUTO: 79 FL (ref 78–100)
NITRATE UR QL: NEGATIVE
PH UR STRIP: 5.5 [PH] (ref 5–8)
PLATELET # BLD AUTO: 349 10E3/UL (ref 150–450)
POTASSIUM BLD-SCNC: 3.5 MMOL/L (ref 3.5–5)
PROT SERPL-MCNC: 8.5 G/DL (ref 6–8)
RBC # BLD AUTO: 4.7 10E6/UL (ref 3.8–5.2)
RBC #/AREA URNS AUTO: NORMAL /HPF
SODIUM SERPL-SCNC: 139 MMOL/L (ref 136–145)
SP GR UR STRIP: 1.01 (ref 1–1.03)
TRIGL SERPL-MCNC: 121 MG/DL
UROBILINOGEN UR STRIP-ACNC: 0.2 E.U./DL
WBC # BLD AUTO: 9.7 10E3/UL (ref 4–11)
WBC #/AREA URNS AUTO: NORMAL /HPF

## 2021-07-12 PROCEDURE — 86481 TB AG RESPONSE T-CELL SUSP: CPT | Performed by: FAMILY MEDICINE

## 2021-07-12 PROCEDURE — 99395 PREV VISIT EST AGE 18-39: CPT | Performed by: FAMILY MEDICINE

## 2021-07-12 PROCEDURE — 36415 COLL VENOUS BLD VENIPUNCTURE: CPT | Performed by: FAMILY MEDICINE

## 2021-07-12 PROCEDURE — 86735 MUMPS ANTIBODY: CPT | Performed by: FAMILY MEDICINE

## 2021-07-12 PROCEDURE — 86765 RUBEOLA ANTIBODY: CPT | Performed by: FAMILY MEDICINE

## 2021-07-12 PROCEDURE — 81001 URINALYSIS AUTO W/SCOPE: CPT | Performed by: FAMILY MEDICINE

## 2021-07-12 PROCEDURE — 80053 COMPREHEN METABOLIC PANEL: CPT | Performed by: FAMILY MEDICINE

## 2021-07-12 PROCEDURE — 85027 COMPLETE CBC AUTOMATED: CPT | Performed by: FAMILY MEDICINE

## 2021-07-12 PROCEDURE — 80061 LIPID PANEL: CPT | Performed by: FAMILY MEDICINE

## 2021-07-12 PROCEDURE — 86706 HEP B SURFACE ANTIBODY: CPT | Performed by: FAMILY MEDICINE

## 2021-07-12 RX ORDER — NITROFURANTOIN 25; 75 MG/1; MG/1
CAPSULE ORAL
COMMUNITY
Start: 2020-09-12

## 2021-07-12 RX ORDER — ETONOGESTREL 68 MG/1
IMPLANT SUBCUTANEOUS
COMMUNITY

## 2021-07-12 RX ORDER — LABETALOL 100 MG/1
TABLET, FILM COATED ORAL
COMMUNITY

## 2021-07-12 RX ORDER — LABETALOL 100 MG/1
100 TABLET, FILM COATED ORAL 2 TIMES DAILY
Qty: 60 TABLET | Refills: 3 | Status: SHIPPED | OUTPATIENT
Start: 2021-07-12

## 2021-07-12 RX ORDER — LABETALOL 100 MG/1
TABLET, FILM COATED ORAL
COMMUNITY
Start: 2020-12-29

## 2021-07-12 RX ORDER — AMOXICILLIN 500 MG/1
CAPSULE ORAL
COMMUNITY
Start: 2021-06-01

## 2021-07-12 ASSESSMENT — MIFFLIN-ST. JEOR: SCORE: 1191.96

## 2021-07-12 NOTE — PROGRESS NOTES
"    Assessment & Plan     1. Routine history and physical examination of adult  31-year-old female with stable physical exam.    We will check some screening lab work and get QuantiFERON gold.  - Lipid Profile (Chol, Trig, HDL, LDL calc); Future  - CBC with platelets; Future  - Comprehensive metabolic panel (BMP + Alb, Alk Phos, ALT, AST, Total. Bili, TP); Future  - UA with Microscopic reflex to Culture - Clinic Collect  - Quantiferon TB Gold Plus; Future  - Lipid Profile (Chol, Trig, HDL, LDL calc)  - CBC with platelets  - Comprehensive metabolic panel (BMP + Alb, Alk Phos, ALT, AST, Total. Bili, TP)  - Quantiferon TB Gold Plus  - Urine Microscopic    2. PIH (pregnancy induced hypertension), antepartum  History of pregnancy-induced hypertension., Now has continued to have some elevated readings    3. Benign essential hypertension  Hypertension we will start on labetalol 100 mg twice a day she has a blood pressure cuff at home will find out the blood pressure when we call her back with the labs  - labetalol (NORMODYNE) 100 MG tablet; Take 1 tablet (100 mg) by mouth 2 times daily  Dispense: 60 tablet; Refill: 3    4. Immunization counseling  Patient is up-to-date on immunizations she did need mumps and rubeola antibodies and hepatitis B surface antibody checked  - Hepatitis B Surface Antibody; Future  - Mumps Immune Status, IgG  - Rubeola Antibody IgG; Future  - Hepatitis B Surface Antibody  - Rubeola Antibody IgG    Patient be contacted regarding the results of the labs. We will discuss follow-up will get her blood pressure check, she checks at home    She will be on the labetalol as outlined         BMI:   Estimated body mass index is 27.5 kg/m  as calculated from the following:    Height as of this encounter: 1.47 m (4' 9.87\").    Weight as of this encounter: 59.4 kg (131 lb).         Return in about 6 months (around 1/12/2022) for Follow up.    Luis Armando Fonseca MD  [facility]    Subjective " "  Olga is a 31 year old who presents for the following health issues    HPI   This patient comes in for a physical. Patient had a Pap smear this past year during her pregnancy she [facility], Dr. Danielson.    She delivered in March I believe.    Patient was on labetalol during pregnancy but postpartum had pressures that were in the upper normal range so that was stopped    At home she is running in the low 140s over 90s here it was 145/95 and 144/92 pulse was 98. I restarted labetalol 100 mg 1 twice a day    She is a non-smoker    She is on oral contraception as well.    Family history hypertension in both parents and diabetes in her mother.    Screening labs today include lipid CMP UA CBC.    She needed titers for hepatitis B surface antibody mumps and measles.    She said previous varicella titers and rubella titer indicating immunity.    She needs a form filled out for school and this was done.    No additional concern or issue      Review of Systems   10 point review of systems positive as outlined above otherwise negative      Objective    BP (!) 144/92 (BP Location: Left arm, Patient Position: Sitting, Cuff Size: Adult Regular)   Pulse 98   Temp 98.3  F (36.8  C) (Temporal)   Ht 1.47 m (4' 9.87\")   Wt 59.4 kg (131 lb)   LMP 05/31/2021 (Approximate)   BMI 27.50 kg/m    Body mass index is 27.5 kg/m .  Physical Exam   Vital signs elevated blood pressure as outlined    General appearance no acute distress    HEENT canals and TMs normal oropharynx is clear pupils react normally extract movements full    Lungs are clear no rales or rhonchi heart regular S1-S2 no murmur    Abdomen soft nontender no axillary or inguinal adenopathy    No masses through the abdomen    Extremities without edema.    Reflexes are normal    Skin is normal no rashes    Deferred on pelvic she had a Pap smear during the pregnancy.    Lab work lipid CMP UA and CBC pending as well as the mumps and measles antibody and hepatitis B " surface antibody.

## 2021-07-13 LAB — HBV SURFACE AB SERPLBLD QL IA.RAPID: POSITIVE

## 2021-07-14 LAB
GAMMA INTERFERON BACKGROUND BLD IA-ACNC: 0.05 IU/ML
M TB IFN-G BLD-IMP: NEGATIVE
M TB IFN-G CD4+ BCKGRND COR BLD-ACNC: 9.95 IU/ML
MEV IGG SER IA-ACNC: POSITIVE
MITOGEN IGNF BCKGRD COR BLD-ACNC: -0.02 IU/ML
MITOGEN IGNF BCKGRD COR BLD-ACNC: 0 IU/ML
MUV IGG SER QL IA: POSITIVE
QUANTIFERON MITOGEN: 10 IU/ML
QUANTIFERON NIL TUBE: 0.05 IU/ML
QUANTIFERON TB1 TUBE: 0.03 IU/ML
QUANTIFERON TB2 TUBE: 0.05

## 2021-10-10 ENCOUNTER — HEALTH MAINTENANCE LETTER (OUTPATIENT)
Age: 31
End: 2021-10-10

## 2022-07-16 ENCOUNTER — HEALTH MAINTENANCE LETTER (OUTPATIENT)
Age: 32
End: 2022-07-16

## 2022-09-18 ENCOUNTER — HEALTH MAINTENANCE LETTER (OUTPATIENT)
Age: 32
End: 2022-09-18

## 2023-07-30 ENCOUNTER — HEALTH MAINTENANCE LETTER (OUTPATIENT)
Age: 33
End: 2023-07-30